# Patient Record
Sex: MALE | Race: WHITE | Employment: OTHER | URBAN - METROPOLITAN AREA
[De-identification: names, ages, dates, MRNs, and addresses within clinical notes are randomized per-mention and may not be internally consistent; named-entity substitution may affect disease eponyms.]

---

## 2019-01-02 ENCOUNTER — HOSPITAL ENCOUNTER (OUTPATIENT)
Dept: PHYSICAL THERAPY | Age: 69
Discharge: HOME | End: 2019-01-02
Payer: MEDICARE

## 2019-01-02 PROCEDURE — 97110 THERAPEUTIC EXERCISES: CPT

## 2019-01-02 PROCEDURE — 97161 PT EVAL LOW COMPLEX 20 MIN: CPT

## 2019-01-02 PROCEDURE — 97140 MANUAL THERAPY 1/> REGIONS: CPT
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
## 2019-01-04 ENCOUNTER — HOSPITAL ENCOUNTER (OUTPATIENT)
Dept: PHYSICAL THERAPY | Age: 69
Discharge: HOME | End: 2019-01-04
Payer: MEDICARE

## 2019-01-04 PROCEDURE — 97140 MANUAL THERAPY 1/> REGIONS: CPT | Performed by: PHYSICAL THERAPIST

## 2019-01-04 PROCEDURE — 97110 THERAPEUTIC EXERCISES: CPT | Performed by: PHYSICAL THERAPIST
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
## 2019-01-07 ENCOUNTER — HOSPITAL ENCOUNTER (OUTPATIENT)
Dept: PHYSICAL THERAPY | Age: 69
Discharge: HOME | End: 2019-01-07
Payer: MEDICARE

## 2019-01-07 PROCEDURE — 97140 MANUAL THERAPY 1/> REGIONS: CPT

## 2019-01-07 PROCEDURE — 97110 THERAPEUTIC EXERCISES: CPT

## 2019-01-07 NOTE — PROGRESS NOTES
PT DAILY TREATMENT NOTE  Patient Name: Aj Adams 
Date:2019 : 1950 [x]  Patient  Verified Payor: [place] MEDICARE / Plan: Barbara Ann y / Product Type: Medicare / In time:730 Out time:815 Total Treatment Time (min): 45 Total Timed Codes (min):45 
1:1 Treatment Time (min): 730 - 808 (38) Visit #: 3 of 8-10 Treatment Area: Lower back pain [M54.5] SUBJECTIVE Pain Level (0-10 scale): 4 Any medication changes, allergies to medications, adverse drug reactions, diagnosis change, or new procedure performed?: [x] No    [] Yes (see summary sheet for update) Subjective functional status/changes:   [] No changes reported \"I have pain in my low back and its running down my left leg. Never had that before\" OBJECTIVE Modality rationale: PD  
[x] Skin assessment post-treatment:  [x]intact []redness- no adverse reaction 
     []redness  adverse reaction:  
 
 
36 min Therapeutic Exercise:  [x] See flow sheet - assess for elevated pain levels while on Nustep Rationale: increase ROM, increase strength, improve coordination and increase proprioception to improve the patients ability to perform functional mobility and improve activity  endurance 9 min Manual Therapy: SI check - equal today . L piriformis release in prone Rationale: decrease pain, increase ROM, decrease trigger points and increase postural awareness to improve pelvic alignment. x min Patient Education: [x] Review HEP  - add HS stretch and piriformis stretch - patient declined pictures Other Objective/Functional Measures: STG - HEP compliance - good LYNETTE - no exacerbation of LE pain Poor lumbo pelvic disassociation Pain Level (0-10 scale) post treatment: 2/10 ASSESSMENT/Changes in Function: 
Pt reports no adverse after first FU treatment, however reports increased LS pain with radicular sx into L LE starting last night after prolonged standing for dinner party. Patient reports compliance with HEP and reports \"discomfort\" in LS with LYNETTE. Patient demo increased tenderness of L piriformis and decreased PROM of L hip in prone. Consistent VCing to avoid valsalva throughout treatment. Focus treatment on core strengthening as body may be reacting to equal pelvic alignment. Nothing exacerbated LE sx while in PT and encouraged patient to avoid anything that does. Patient will continue to benefit from skilled PT services to modify and progress therapeutic interventions, address functional mobility deficits, address ROM deficits, address strength deficits, analyze and address soft tissue restrictions, analyze and cue movement patterns, analyze and modify body mechanics/ergonomics, assess and modify postural abnormalities and instruct in home and community integration to attain remaining goals. [x]  See Plan of Care 
[]  See progress note/recertification 
[]  See Discharge Summary Progress towards goals / Updated goals: · Short Term Goals: To be accomplished in  1  weeks: 1. Pt will be independent and compliant with HEP - Goal progressing - HEP est with no questions. HEP will be modified and progressed as appropriate 1/7/19 · Long Term Goals: To be accomplished in  8-10  treatments: 1. Patient will increase FOTO score to 67/100 for indications of increased functional mobility. 2.  Patient will demo LS flexion to fingertips to ankles for ease with LE dressing 3. Patient will demo 100% bridge with no increase in pain for improved for strength for lifting 4. Patient will demo 3/10 pain at worst for improved activity tolerance PLAN [x]  Upgrade activities as tolerated     []  Continue plan of care 
[]  Update interventions per flow sheet      
[]  Discharge due to:_ 
[x]  Other:_assess for radicular sx NIURKA Levine, PT 1/7/2019

## 2019-01-11 ENCOUNTER — HOSPITAL ENCOUNTER (OUTPATIENT)
Dept: PHYSICAL THERAPY | Age: 69
Discharge: HOME | End: 2019-01-11
Payer: MEDICARE

## 2019-01-11 PROCEDURE — 97140 MANUAL THERAPY 1/> REGIONS: CPT

## 2019-01-11 PROCEDURE — 97110 THERAPEUTIC EXERCISES: CPT

## 2019-01-11 NOTE — PROGRESS NOTES
PT DAILY TREATMENT NOTE  Patient Name: Mark Osorio 
Date:2019 : 1950 [x]  Patient  Verified Payor: [place] MEDICARE / Plan: Barbara Ann y / Product Type: Medicare / In time:731 Out time:820 Total Treatment Time (min): 49 Total Timed Codes (min):49 
1:1 Treatment Time (min): 738 - 820 (42) Visit #: 4 of 8-10 Treatment Area: Lower back pain [M54.5] SUBJECTIVE Pain Level (0-10 scale): 2 Any medication changes, allergies to medications, adverse drug reactions, diagnosis change, or new procedure performed?: [x] No    [] Yes (see summary sheet for update) Subjective functional status/changes:   [] No changes reported \"About the same \"   Patient denies radicular sx OBJECTIVE Modality rationale: PD  
[x] Skin assessment post-treatment:  [x]intact []redness- no adverse reaction 
     []redness  adverse reaction:  
 
 
49 min Therapeutic Exercise:  [x] See flow sheet Rationale: increase ROM, increase strength, improve coordination and increase proprioception to improve the patients ability to perform functional mobility and improve activity  endurance 11 min Manual Therapy: SI check - equal today, L piriformis stretch supine, prone hip IR, supine long axis distraction with ER Rationale: decrease pain, increase ROM, decrease trigger points and increase postural awareness to improve pelvic alignment. x min Patient Education: [x] Review HEP  - gave picture of piriformis stretch to retention Other Objective/Functional Measures: HEP - \"the one at the stairs is getting easier. The belly one is the hardest.\" 
 
Pain Level (0-10 scale) post treatment: 1/10 ASSESSMENT/Changes in Function: 
Pt reports no radicular symptoms today and pain centralized to L LS. Patient denies tenderness to touch of LPS or piriformis and no LS rotations noted. Decreased L hip ER compared to R.   Improved with manual intervention and ER stretching. Patient reports feeling \"better\" upon leaving Patient will continue to benefit from skilled PT services to modify and progress therapeutic interventions, address functional mobility deficits, address ROM deficits, address strength deficits, analyze and address soft tissue restrictions, analyze and cue movement patterns, analyze and modify body mechanics/ergonomics, assess and modify postural abnormalities and instruct in home and community integration to attain remaining goals. [x]  See Plan of Care 
[]  See progress note/recertification 
[]  See Discharge Summary Progress towards goals / Updated goals: All goals reviewed and progressing appropriately as of 1/11/2019 · Short Term Goals: To be accomplished in  1  weeks: 1. Pt will be independent and compliant with HEP - Goal progressing - HEP est with no questions. HEP will be modified and progressed as appropriate 1/7/19 · Long Term Goals: To be accomplished in  8-10  treatments: 1. Patient will increase FOTO score to 67/100 for indications of increased functional mobility. 2.  Patient will demo LS flexion to fingertips to ankles for ease with LE dressing 3. Patient will demo 100% bridge with no increase in pain for improved for strength for lifting 4. Patient will demo 3/10 pain at worst for improved activity tolerance PLAN [x]  Upgrade activities as tolerated     []  Continue plan of care 
[]  Update interventions per flow sheet      
[]  Discharge due to:_ 
[x]  Other:_assess hip symmetry NIURKA Burrell, PT 1/11/2019

## 2019-01-14 ENCOUNTER — HOSPITAL ENCOUNTER (OUTPATIENT)
Dept: PHYSICAL THERAPY | Age: 69
Discharge: HOME | End: 2019-01-14
Payer: MEDICARE

## 2019-01-14 PROCEDURE — 97110 THERAPEUTIC EXERCISES: CPT

## 2019-01-14 PROCEDURE — 97140 MANUAL THERAPY 1/> REGIONS: CPT

## 2019-01-14 NOTE — PROGRESS NOTES
PT DAILY TREATMENT NOTE  Patient Name: Mark Osorio 
Date:2019 : 1950 [x]  Patient  Verified Payor: [place] MEDICARE / Plan: [facility] [place] / Product Type: Medicare / In time: 1:58 pm       Out time: 2:54 pm 
Total Treatment Time (min): 56 Total Timed Codes (min): 56 
1:1 Treatment Time (min): 53 (1:58pm-2:51pm) Visit #: 5 of 8-10 Treatment Area: Lower back pain [M54.5] SUBJECTIVE Pain Level (0-10 scale): 2 Any medication changes, allergies to medications, adverse drug reactions, diagnosis change, or new procedure performed?: [x] No    [] Yes (see summary sheet for update) Subjective functional status/changes:   [] No changes reported \"Always low back pain, but I am getting better movement with the seated cane. \" OBJECTIVE Modality rationale: PD  
[x] Skin assessment post-treatment:  [x]intact []redness- no adverse reaction 
     []redness  adverse reaction:  
 
 
46 min Therapeutic Exercise:  [x] See flow sheet Rationale: increase ROM, increase strength, improve coordination and increase proprioception to improve the patients ability to perform functional mobility and improve activity  endurance 10 min Manual Therapy: SI check - symmetrical, attempted to correct for (R) pelvic shift, but able to correct for only 50%; prone PA mobs with L piriformis DTM/TPR Rationale: decrease pain, increase ROM, decrease trigger points and increase postural awareness to improve pelvic alignment. X min Patient Education: [x] Review HEP Other Objective/Functional Measures: L/S AROM: flex fingertips to 1/2 inch above ankles (R) pelvic shift in stance - either caused by possible piriformis tightness on (L) or (L) LLD No pain with LYNETTE after PA mobs to L2-5 Bridge 75% Pain Level (0-10 scale) post treatment: 0-1 ASSESSMENT/Changes in Function: 
Patient with limited spring from L2-5, inc spring at L1.  Patient valerie symmetrical pelvic alignment with supine to sit testing, but right pelvic shift in stance. Does demo mild piriformis tightness, but no TTP. Possible LLD on the left causing right pelvic shift to normalize. Patient will continue to benefit from skilled PT services to modify and progress therapeutic interventions, address functional mobility deficits, address ROM deficits, address strength deficits, analyze and address soft tissue restrictions, analyze and cue movement patterns, analyze and modify body mechanics/ergonomics, assess and modify postural abnormalities and instruct in home and community integration to attain remaining goals. [x]  See Plan of Care 
[]  See progress note/recertification 
[]  See Discharge Summary Progress towards goals / Updated goals:  
           Short Term Goals: To be accomplished in  1  weeks: 1. Pt will be independent and compliant with HEP - Goal progressing - HEP est with no questions. HEP will be modified and progressed as appropriate 1/7/19 Long Term Goals: To be accomplished in  8-10  treatments: 1. Patient will increase FOTO score to 67/100 for indications of increased functional mobility. 2.  Patient will demo LS flexion to fingertips to ankles for ease with LE dressing. -Goal progressing; pt demos LS flexion fingertips to 1/2 inch above ankles (1/14/19) 3. Patient will demo 100% bridge with no increase in pain for improved for strength for lifting. -Goal progressing; pt demos 75% bridge (1/14/19) 4. Patient will demo 3/10 pain at worst for improved activity tolerance PLAN [x]  Upgrade activities as tolerated     [x]  Continue plan of care 
[]  Update interventions per flow sheet      
[]  Discharge due to:_ 
[x]  Other: assess need for (L) heel lift to normalize for pelvic shift Unknown Star, CHUN 1/14/2019

## 2019-01-15 ENCOUNTER — APPOINTMENT (OUTPATIENT)
Dept: PHYSICAL THERAPY | Age: 69
End: 2019-01-15
Payer: MEDICARE

## 2019-01-18 ENCOUNTER — HOSPITAL ENCOUNTER (OUTPATIENT)
Dept: PHYSICAL THERAPY | Age: 69
Discharge: HOME | End: 2019-01-18
Payer: MEDICARE

## 2019-01-18 PROCEDURE — 97140 MANUAL THERAPY 1/> REGIONS: CPT

## 2019-01-18 PROCEDURE — 97110 THERAPEUTIC EXERCISES: CPT

## 2019-01-18 NOTE — PROGRESS NOTES
PT DAILY TREATMENT NOTE  Patient Name: Maria Fernanda Gabriel 
Date:2019 : 1950 [x]  Patient  Verified [place]or: [place] / Plan: [facility] / Product Type: Medicare / In time: 8:05 am          Out time: 9:05 am 
Total Treatment Time (min): 60 Total Timed Codes (min): 60 
1:1 Treatment Time (min): 8:12am-9:05am (53) Visit #: 6 of 8-10 Treatment Area: Lower back pain [M54.5] SUBJECTIVE Pain Level (0-10 scale): 1 Any medication changes, allergies to medications, adverse drug reactions, diagnosis change, or new procedure performed?: [x] No    [] Yes (see summary sheet for update) Subjective functional status/changes:   [] No changes reported \"Feeling pretty good today. I felt pain-free for a whole day after last time, and when it came back, it was significantly less. \" OBJECTIVE Modality rationale: PD  
[x] Skin assessment post-treatment:  [x]intact []redness- no adverse reaction 
     []redness  adverse reaction:  
 
49 min Therapeutic Exercise:  [x] See flow sheet: added hook-lying hip ABD for glut med strengthening for pelvic symmetry Rationale: increase ROM, increase strength, improve coordination and increase proprioception to improve the patients ability to perform functional mobility and improve activity  endurance 11 min Manual Therapy: prone PA mobs with L piriformis DTM/TPR, corrected for right pelvic shift in stance, SI check - right post inn rotation corrected with MET and shot gun (no cavitation) Rationale: decrease pain, increase ROM, decrease trigger points and increase postural awareness to improve pelvic alignment. X min Patient Education: [x] Review HEP Other Objective/Functional Measures: SI - right posterior innominate rotation Bridge 90% after correction Held heel lift as pt notes sx resolution from previous tx; cont current program to give time for mm elongation/strengthening.  
 
Pain Level (0-10 scale) post treatment: 0 
 ASSESSMENT/Changes in Function: 
Patient cont's with R post inn rotation that was easily corrected today with MET; discussed avoiding crossing the legs while sitting to prevent recurrent rotation. Cont's to note discomfort with LYNETTE, but reduced after PA mobs to the LS. Patient cont to req cueing to prevent abdominal doming - fair carryover. Patient will continue to benefit from skilled PT services to modify and progress therapeutic interventions, address functional mobility deficits, address ROM deficits, address strength deficits, analyze and address soft tissue restrictions, analyze and cue movement patterns, analyze and modify body mechanics/ergonomics, assess and modify postural abnormalities and instruct in home and community integration to attain remaining goals. [x]  See Plan of Care 
[]  See progress note/recertification 
[]  See Discharge Summary Progress towards goals / Updated goals:  
           Short Term Goals: To be accomplished in  1  weeks: 1. Pt will be independent and compliant with HEP - Goal progressing - HEP est with no questions. HEP will be modified and progressed as appropriate 1/7/19 Long Term Goals: To be accomplished in  8-10  treatments: 1. Patient will increase FOTO score to 67/100 for indications of increased functional mobility. 2.  Patient will demo LS flexion to fingertips to ankles for ease with LE dressing. -Goal progressing; pt demos LS flexion fingertips to 1/2 inch above ankles (1/14/19) 3. Patient will demo 100% bridge with no increase in pain for improved for strength for lifting. -Goal progressing; pt demos 90% bridge (1/18/19) 4. Patient will demo 3/10 pain at worst for improved activity tolerance PLAN [x]  Upgrade activities as tolerated     [x]  Continue plan of care 
[]  Update interventions per flow sheet      
[]  Discharge due to:_ 
[x]  Other: assess need for (L) heel lift to normalize for pelvic shift; need for LS rotational mobs Flavio Dugan, CHUN 1/18/2019

## 2019-01-21 ENCOUNTER — HOSPITAL ENCOUNTER (OUTPATIENT)
Dept: PHYSICAL THERAPY | Age: 69
Discharge: HOME | End: 2019-01-21
Payer: MEDICARE

## 2019-01-21 PROCEDURE — 97140 MANUAL THERAPY 1/> REGIONS: CPT

## 2019-01-21 PROCEDURE — 97110 THERAPEUTIC EXERCISES: CPT

## 2019-01-21 NOTE — PROGRESS NOTES
PT DAILY TREATMENT NOTE  Patient Name: Kimberli Barnett 
Date:2019 : 1950 [x]  Patient  Verified Payor: [place] / Plan: [facility] / Product Type: Medicare / In time: 830  Out time:925 Total Treatment Time (min): 55 Total Timed Codes (min): 55 
1:1 Treatment Time (min):851 - 925 (34) Visit #: 7 of 8-10 Treatment Area: Lower back pain [M54.5] SUBJECTIVE Pain Level (0-10 scale): 2 Any medication changes, allergies to medications, adverse drug reactions, diagnosis change, or new procedure performed?: [x] No    [] Yes (see summary sheet for update) Subjective functional status/changes:   [] No changes reported \"I feel looser but the pain is about the same. \" OBJECTIVE Modality rationale: PD  
[x] Skin assessment post-treatment:  [x]intact []redness- no adverse reaction 
     []redness  adverse reaction:  
 
45 min Therapeutic Exercise:  [x] See flow sheet:   
Rationale: increase ROM, increase strength, improve coordination and increase proprioception to improve the patients ability to perform functional mobility and improve activity  endurance 10 min Manual Therapy: SI check : R ant inn, assessed for LLD, educated on  Heel lift use Rationale: decrease pain, increase ROM, decrease trigger points and increase postural awareness to improve pelvic alignment. X withMT min Patient Education: [x] Review HEP - heel lift Other Objective/Functional Measures:  
 % improvement : pain is the same Added prone shift correction Bridge - 90% No LS rotation Pain Level (0-10 scale) post treatment: 0 
 
ASSESSMENT/Changes in Function: 
Patient reports \"sometimes I feel like I am getting better. \", but pain levels are consistent from IE. No change to LS pain with L shift on LYNETTE. Patient demo improved bridge height and reports no pain ,\"but it doesn't feel good. \"  Added heel lift today as patient is compliant with self MET and multiple in clinic mET are unsuccessful. Upon re-measurement, patient demo with R upslip in standing and R LE shorter than L (different than at IE, possibly sec to decreased compensatory patterns). Heel lift added to R shoe and patient encouraged to wear as much as is tolerated. Will assess response NV Patient will continue to benefit from skilled PT services to modify and progress therapeutic interventions, address functional mobility deficits, address ROM deficits, address strength deficits, analyze and address soft tissue restrictions, analyze and cue movement patterns, analyze and modify body mechanics/ergonomics, assess and modify postural abnormalities and instruct in home and community integration to attain remaining goals. [x]  See Plan of Care 
[]  See progress note/recertification 
[]  See Discharge Summary Progress towards goals / Updated goals:  
           Short Term Goals: To be accomplished in  1  weeks: 1. Pt will be independent and compliant with HEP - Goal progressing - HEP est with no questions. HEP will be modified and progressed as appropriate 1/7/19 Long Term Goals: To be accomplished in  8-10  treatments: 1. Patient will increase FOTO score to 67/100 for indications of increased functional mobility. 2.  Patient will demo LS flexion to fingertips to ankles for ease with LE dressing. -Goal progressing; pt demos LS flexion fingertips to 1/2 inch above ankles (1/14/19) 3. Patient will demo 100% bridge with no increase in pain for improved for strength for lifting. -Goal progressing; pt demos 90% bridge (1/18/19) 4. Patient will demo 3/10 pain at worst for improved activity tolerance goal not  Met- no change to pain levels as reported by patient at this time. 1/21/19 PLAN [x]  Upgrade activities as tolerated     [x]  Continue plan of care 
[]  Update interventions per flow sheet      
[]  Discharge due to:_ 
 [x]  Other: patient has 3 more apts scheduled Assess response to heel lift. Laina Donohue, PT 1/21/2019

## 2019-01-23 ENCOUNTER — HOSPITAL ENCOUNTER (OUTPATIENT)
Dept: PHYSICAL THERAPY | Age: 69
Discharge: HOME | End: 2019-01-23
Payer: MEDICARE

## 2019-01-23 PROCEDURE — 97110 THERAPEUTIC EXERCISES: CPT

## 2019-01-23 PROCEDURE — 97140 MANUAL THERAPY 1/> REGIONS: CPT

## 2019-01-23 NOTE — PROGRESS NOTES
PT DAILY TREATMENT NOTE  Patient Name: Yoan  
Date:2019 : 1950 [x]  Patient  Verified Payor: [place] / Plan: [facility] [place] / Product Type: Medicare / In time: 830 Out time:924 Total Treatment Time (min): 54 Total Timed Codes (min): 54 
1:1 Treatment Time (min):847 - 910 (23) Visit #: 8 of 8-10 Treatment Area: Lower back pain [M54.5] SUBJECTIVE Pain Level (0-10 scale): 3/10 Any medication changes, allergies to medications, adverse drug reactions, diagnosis change, or new procedure performed?: [x] No    [] Yes (see summary sheet for update) Subjective functional status/changes:   [] No changes reported \"I was in a lot of pain yesterday\" OBJECTIVE Modality rationale: PD  
[x] Skin assessment post-treatment:  [x]intact []redness- no adverse reaction 
     []redness  adverse reaction:  
 
54 min Therapeutic Exercise:  [x] See flow sheet:   
Rationale: increase ROM, increase strength, improve coordination and increase proprioception to improve the patients ability to perform functional mobility and improve activity  endurance 10 min Manual Therapy: SI check : equal , IASTM to L LS paraspinals and QL Rationale: decrease pain, increase ROM, decrease trigger points and increase postural awareness to improve pelvic alignment. X withMT min Patient Education: [x] Review HEP - heel lift Other Objective/Functional Measures:  
 Equal SI landmarks Pain Level (0-10 scale) post treatment: 2 
 
ASSESSMENT/Changes in Function: 
Patient reports increased pain Monday afternoon after heel lift use. Patient doesn't think its related to heel lift, however patient educated that the change in the body's posture could have lead to pain. Pain however is steadily decreasing and patient encouraged to continue to use lift. SI appear normalized today with heel lift use. No pain with LYNETTE after MT today as well. Patient will continue to benefit from skilled PT services to modify and progress therapeutic interventions, address functional mobility deficits, address ROM deficits, address strength deficits, analyze and address soft tissue restrictions, analyze and cue movement patterns, analyze and modify body mechanics/ergonomics, assess and modify postural abnormalities and instruct in home and community integration to attain remaining goals. [x]  See Plan of Care 
[]  See progress note/recertification 
[]  See Discharge Summary Progress towards goals / Updated goals:  
           Short Term Goals: To be accomplished in  1  weeks: 1. Pt will be independent and compliant with HEP - Goal progressing - HEP est with no questions. HEP will be modified and progressed as appropriate 1/7/19 Long Term Goals: To be accomplished in  8-10  treatments: 1. Patient will increase FOTO score to 67/100 for indications of increased functional mobility.  goal to be assessed for PN next week 1/23/19 2. Patient will demo LS flexion to fingertips to ankles for ease with LE dressing. -Goal progressing; pt demos LS flexion fingertips to 1/2 inch above ankles (1/14/19) 3. Patient will demo 100% bridge with no increase in pain for improved for strength for lifting. -Goal progressing; pt demos 90% bridge (1/18/19) 4. Patient will demo 3/10 pain at worst for improved activity tolerance goal not  Met- no change to pain levels as reported by patient at this time. 1/21/19 PLAN [x]  Upgrade activities as tolerated     [x]  Continue plan of care 
[]  Update interventions per flow sheet      
[]  Discharge due to:_ 
[x]  Other: patient has 2 more apts scheduled for next week Kanu Ford PT 1/23/2019

## 2019-01-28 ENCOUNTER — HOSPITAL ENCOUNTER (OUTPATIENT)
Dept: PHYSICAL THERAPY | Age: 69
End: 2019-01-28
Payer: MEDICARE

## 2019-01-30 ENCOUNTER — APPOINTMENT (OUTPATIENT)
Dept: PHYSICAL THERAPY | Age: 69
End: 2019-01-30
Payer: MEDICARE

## 2019-02-01 ENCOUNTER — HOSPITAL ENCOUNTER (OUTPATIENT)
Dept: PHYSICAL THERAPY | Age: 69
Discharge: HOME | End: 2019-02-01
Payer: MEDICARE

## 2019-02-01 PROCEDURE — 97140 MANUAL THERAPY 1/> REGIONS: CPT

## 2019-02-01 PROCEDURE — 97110 THERAPEUTIC EXERCISES: CPT

## 2019-02-05 ENCOUNTER — APPOINTMENT (OUTPATIENT)
Dept: PHYSICAL THERAPY | Age: 69
End: 2019-02-05
Payer: MEDICARE

## 2019-08-06 NOTE — PROGRESS NOTES
PT DAILY TREATMENT NOTE     Patient Name: Carolyn Home  Date:2019  : 1950  [x]  Patient  Verified  Payor: [place] / Plan: [place] MEDICARE PART A [employer] B / Product Type: Medicare /    In time: 1:31 pm    Out time: 2:30 pm  Total Treatment Time (min): 59  Total Timed Codes (min): 49  1:1 Treatment Time (min): 40  Visit #: 9 of 8-10    Treatment Area: Lower back pain [M54.5]    SUBJECTIVE  Pain Level (0-10 scale): 3  Any medication changes, allergies to medications, adverse drug reactions, diagnosis change, or new procedure performed?: [x] No    [] Yes (see summary sheet for update)  Subjective functional status/changes:   [] No changes reported  \"Not too bad today. \"    OBJECTIVE  Modality rationale: PD   [x] Skin assessment post-treatment:  [x]intact []redness- no adverse reaction       []redness  adverse reaction:     49 min Therapeutic Exercise:  [x] See flow sheet:    Rationale: increase ROM, increase strength, improve coordination and increase proprioception to improve the patients ability to perform functional mobility and improve activity  endurance    10 min Manual Therapy: SI check : equal , IASTM to L LS paraspinals and QL    Rationale: decrease pain, increase ROM, decrease trigger points and increase postural awareness to improve pelvic alignment. X withMT min Patient Education: [x] Review HEP - heel lift      Other Objective/Functional Measures:     Pain Level (0-10 scale) post treatment: 2    ASSESSMENT/Changes in Function:  Good tolerance to treatment.     Patient will continue to benefit from skilled PT services to modify and progress therapeutic interventions, address functional mobility deficits, address ROM deficits, address strength deficits, analyze and address soft tissue restrictions, analyze and cue movement patterns, analyze and modify body mechanics/ergonomics, assess and modify postural abnormalities and instruct in home and community integration to attain remaining goals. [x]  See Plan of Care  []  See progress note/recertification  []  See Discharge Summary           Progress towards goals / Updated goals:              Short Term Goals: To be accomplished in  1  weeks:  1. Pt will be independent and compliant with HEP - Goal progressing - HEP est with no questions. HEP will be modified and progressed as appropriate 1/7/19       Long Term Goals: To be accomplished in  8-10  treatments:  1. Patient will increase FOTO score to 67/100 for indications of increased functional mobility.  goal to be assessed for PN next week 1/23/19  2. Patient will demo LS flexion to fingertips to ankles for ease with LE dressing. -Goal progressing; pt demos LS flexion fingertips to 1/2 inch above ankles (1/14/19)  3. Patient will demo 100% bridge with no increase in pain for improved for strength for lifting. -Goal progressing; pt demos 90% bridge (1/18/19)  4. Patient will demo 3/10 pain at worst for improved activity tolerance goal not  Met- no change to pain levels as reported by patient at this time.  1/21/19    PLAN  [x]  Upgrade activities as tolerated     [x]  Continue plan of care  []  Update interventions per flow sheet       []  Discharge due to:_  [x]  Other: patient has 2 more apts scheduled for next week     Nancy Duong PTA 2/1/19
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
